# Patient Record
(demographics unavailable — no encounter records)

---

## 2025-03-12 NOTE — HISTORY OF PRESENT ILLNESS
[de-identified] : 60 y/o M presents for follow up. hx. of total thyroidectomy and central compartment neck dissection for classical variant papillary thyroid cancer March 18, 2019.  MATHIAS on May 22, 2019.  Following up with Endo Dr. Shipley. 07/12/24 TSH 0.38 Thyroglobulin <0.20  6/19/2023 Thyroglobulin <0.20  Thyroglobulin antibodies <20.0  12/2023 TSH wnr on Levothyroxine 175mcg daily 7/18/2022 sonogram stable, pending another US for 06/20245  Denies pain, dysphagia, dysphonia and or dyspnea Denies recent fevers or infections, chills

## 2025-03-12 NOTE — CONSULT LETTER
[Dear  ___] : Dear  [unfilled], [Courtesy Letter:] : I had the pleasure of seeing your patient, [unfilled], in my office today. [Please see my note below.] : Please see my note below. [Sincerely,] : Sincerely, [DrCollins  ___] : Dr. MACKAY [FreeTextEntry2] : Hernandez Walls MD ([place], [place])   [FreeTextEntry3] : Cedrick Dolan MD, FACS     [facility] Associate Chair    Department of Otolaryngology  Professor Otolaryngology & Molecular Medicine [facility]

## 2025-07-14 NOTE — HISTORY OF PRESENT ILLNESS
[FreeTextEntry1] : CHIEF COMPLAINT: Thyroid cancer, postsurgical hypothyroidism Surgeon Dr. Cedrick Dolan.  HISTORY OF PRESENTING ILLNESS: The patient is a 62year old male being seen in the office today for evaluation of thyroid cancer, postsurgical hypothyroidism.   Initially discovered nodule: 2018 felt tender lump. At some point, MRI was done and the patient was found to have a thyroid nodule. Thyroid US confirmed a 3.2 x 2.6 x 3.1 cm thyroid nodule. FNA: Left mid lower thyroid nodule, 1/2019. [place] Category V.  Molecular: Thyroseq + for BRAF V600E  Surgery: 3/2019 total thyroidectomy  Surgical Pathology: Papillary thyroid cancer, unifocal, 3.2cm in size, classic variant. No perineural, angio invasion, or extrathyroidal extension. Central (VI) LN +7/9, largest LN size was 0.1cm.  2015 JEFF Risk: Intermediate AJCC 8th edition TNM Stage: [id number] MATHIAS Treatment: 99.9mCi in 5/2019  Surveillance: Post therapy WBS 5/2019 (99.9 mCi): Uptake in right upper tissue in anterior neck/thyroid bed.  No distant iodine avid uptake. WBS 6/2020: Stimulated TG was 0.61 (). No iodine avid tissue uptake in anterior neck/thyroid bed. Resolution of identified tissue in anterior neck/thyroid bed seen on prior post therapy scan.  No distant iodine uptake. Tg: initially post-surgery was 2.4 in 4/2019 prior to MATHIAS; has been undetectable since after MATHIAS.  Most recent 6/2023. Neck US: No evidence of recurrent disease since 10/2019.  Most recent US 6/26/2024.  Postsurgical Hypothyroidism:  He is currently taking 175 mcg daily of levothyroxine x 6.5 tablets/week. Reports compliance with medication.  He is taking it appropriately, on an empty stomach at least 30 minutes before food. Symptoms 7/14/2025: Reports feeling tired during the day.  Endorses some insomnia.  No palpitations, has mild tremors.  Endorses some diarrhea.  Endorses weight gain.  No heat or cold intolerance.  Has occasional lower extremity swelling.  No family history of thyroid cancer. 2 brothers have hypothyroidism. Father also had thyroid history, family is not sure what exact condition. No personal history of radiation exposure to the head and neck area. Reports history of Afib.

## 2025-07-14 NOTE — PHYSICAL EXAM
[Alert] : alert [Well Nourished] : well nourished [No Acute Distress] : no acute distress [Normal Sclera/Conjunctiva] : normal sclera/conjunctiva [EOMI] : extra ocular movement intact [No Proptosis] : no proptosis [Normal Outer Ear/Nose] : the ears and nose were normal in appearance [Normal Hearing] : hearing was normal [No Neck Mass] : no neck mass was observed [No LAD] : no lymphadenopathy [Supple] : the neck was supple [Well Healed Scar] : well healed scar [No Respiratory Distress] : no respiratory distress [No Accessory Muscle Use] : no accessory muscle use [No Edema] : no peripheral edema [No Stigmata of Cushings Syndrome] : no stigmata of Cushings Syndrome [Normal Gait] : normal gait [No Involuntary Movements] : no involuntary movements were seen [No Rash] : no rash [No Skin Lesions] : no skin lesions [Oriented x3] : oriented to person, place, and time [Normal Affect] : the affect was normal [Normal Insight/Judgement] : insight and judgment were intact [Normal Mood] : the mood was normal [de-identified] : mild tremor in finger tips

## 2025-07-14 NOTE — ASSESSMENT
[FreeTextEntry1] : 1. Thyroid Cancer 2. Postsurgical hypothyroidism Molecular: Papillary thyroid cancer, classic variant. BRAF+ Surgery: Total thyroidectomy 3/2019, central neck dissection Surgical Path: Papillary thyroid cancer, unifocal, 3.2cm in size, classic variant. No perineural, angio invasion, or extrathyroidal spread. Central (VI) LN +7/9, largest LN size was 0.1cm. JEFF Risk: Intermediate risk Stage: [id number] MATHIAS History: 99.9mci 5/2019  Surveillance: Post therapy WBS 5/2019 (99.9 mCi): Uptake in right upper tissue in anterior neck/thyroid bed. No distant iodine avid uptake. WBS 6/2020: Stimulated TG was 0.61 (). No iodine avid tissue uptake in anterior neck/thyroid bed. Resolution of identified tissue in anterior neck/thyroid bed seen on prior post therapy scan. No distant iodine uptake. Tg: initially post-surgery was 2.4 in 4/2019 prior to MATHIAS; has been undetectable since after MATHIAS. Most recent 6/2023. Neck US: No evidence of recurrent disease since 10/2019. Most recent US 6/2024.  PLAN:  - TSH goal: 0.5 -2.0 given excellent response to treatment. Patient also with hx of afib. - Levothyroxine supplementation: Check TSH, FT4 today and determine LT4 dose for new goal 0.5-2.0. Currently taking 175mcg daily x 6.5 tablets/week.  Can decrease dose if needed to avoid iatrogenic TSH suppression. -Neck US always with JARET, most recent 6/2024.  Can check in 2 years, around 6/2026. - Check neck exams and thyroglobulin annually.  RTC in 1 year with previsit neck ultrasound.  Yuko Shipley MD [facility] at [address], [address] Ph: [phone] Fax: [phone]